# Patient Record
Sex: FEMALE | URBAN - NONMETROPOLITAN AREA
[De-identification: names, ages, dates, MRNs, and addresses within clinical notes are randomized per-mention and may not be internally consistent; named-entity substitution may affect disease eponyms.]

---

## 2024-02-15 ENCOUNTER — OV EP (OUTPATIENT)
Dept: URBAN - NONMETROPOLITAN AREA CLINIC 2 | Facility: CLINIC | Age: 74
End: 2024-02-15

## 2024-02-15 ENCOUNTER — LAB (OUTPATIENT)
Dept: URBAN - NONMETROPOLITAN AREA CLINIC 2 | Facility: CLINIC | Age: 74
End: 2024-02-15

## 2024-02-15 VITALS
HEIGHT: 62 IN | HEART RATE: 99 BPM | SYSTOLIC BLOOD PRESSURE: 114 MMHG | WEIGHT: 256 LBS | DIASTOLIC BLOOD PRESSURE: 78 MMHG | BODY MASS INDEX: 47.11 KG/M2

## 2024-02-15 RX ORDER — GLIMEPIRIDE 1 MG/1
TAKE 1 TABLET BY MOUTH EVERY DAY BEFORE BREAKFAST TABLET ORAL
Qty: 90 EACH | Refills: 0 | Status: ACTIVE | COMMUNITY

## 2024-02-15 RX ORDER — SODIUM PICOSULFATE, MAGNESIUM OXIDE, AND ANHYDROUS CITRIC ACID 12; 3.5; 1 G/175ML; G/175ML; MG/175ML
175 ML THE FIRST DOSE THE EVENING BEFORE AND SECOND DOSE THE MORNING OF COLONOSCOPY LIQUID ORAL ONCE A DAY
Qty: 350 | OUTPATIENT
Start: 2024-02-15 | End: 2024-02-17

## 2024-02-15 RX ORDER — ZOLPIDEM TARTRATE 10 MG/1
TABLET ORAL
Qty: 30 TABLET | Status: ACTIVE | COMMUNITY

## 2024-02-15 RX ORDER — LOSARTAN POTASSIUM 50 MG/1
TABLET, FILM COATED ORAL
Qty: 90 TABLET | Status: ACTIVE | COMMUNITY

## 2024-02-15 RX ORDER — EZETIMIBE 10 MG/1
TAKE 1 TABLET (10 MG TOTAL) BY MOUTH IN THE MORNING TABLET ORAL
Qty: 90 EACH | Refills: 0 | Status: ACTIVE | COMMUNITY

## 2024-02-15 RX ORDER — KETOCONAZOLE 20 MG/G
CREAM TOPICAL
Qty: 60 GRAM | Status: ACTIVE | COMMUNITY

## 2024-02-15 NOTE — PHYSICAL EXAM CONSTITUTIONAL:
The skin of the bilateral groins and right arm was clipped, prepped and draped in the usual sterile manner. (If not otherwise specified, skin prep was bilateral.) alert , in no acute distress , well developed, well nourished

## 2024-02-15 NOTE — HPI-TODAY'S VISIT:
2/15/2024 The patient presents for colorectal cancer screening. The patient denies abdominal pain, nausea, vomiting. No change in bowel habits or rectal bleeding. No history of anemia. No family history of colon cancer, colon polyps, or IBD. Denies anticoagulation. Risks and benefits were discussed and the patient agrees to pursue colon cancer screening with colonoscopy. Prev colon 3/2014 at outside location. Requests Dr. Jackson. SP

## 2024-04-22 ENCOUNTER — COLON (OUTPATIENT)
Dept: URBAN - METROPOLITAN AREA MEDICAL CENTER 1 | Facility: MEDICAL CENTER | Age: 74
End: 2024-04-22
Payer: MEDICARE

## 2024-04-22 DIAGNOSIS — D12.2 ADENOMA OF ASCENDING COLON: ICD-10-CM

## 2024-04-22 DIAGNOSIS — Z12.11 COLON CANCER SCREENING: ICD-10-CM

## 2024-04-22 PROCEDURE — 45385 COLONOSCOPY W/LESION REMOVAL: CPT | Performed by: INTERNAL MEDICINE
